# Patient Record
(demographics unavailable — no encounter records)

---

## 2024-10-29 NOTE — PROCEDURE
[de-identified] :  Procedure: Knee joint injection Laterality:  BILATERAL Indication: Osteoarthritis - discussed with patient Skin prep: alcohol and chlorhexidine Anesthesia: ethyl chloride spray Needle: 20-gauge Portal: inferolateral Aspiration: none Injectate: 2cc of 2% lidocaine, 2cc of 0.5% bupivacaine, and 1cc of 40mg/mL triamcinolone Dressing: Band-aid Complications: None  - RTC in 3 months to repeat bilateral knee CSI or to have surgical discussion - New PT script provided - Tylenol + Diclofenac prn

## 2024-10-29 NOTE — PROCEDURE
[de-identified] :  Procedure: Knee joint injection Laterality:  BILATERAL Indication: Osteoarthritis - discussed with patient Skin prep: alcohol and chlorhexidine Anesthesia: ethyl chloride spray Needle: 20-gauge Portal: inferolateral Aspiration: none Injectate: 2cc of 2% lidocaine, 2cc of 0.5% bupivacaine, and 1cc of 40mg/mL triamcinolone Dressing: Band-aid Complications: None  - RTC in 3 months to repeat bilateral knee CSI or to have surgical discussion - New PT script provided - Tylenol + Diclofenac prn

## 2025-01-23 NOTE — PHYSICAL EXAM
[General Appearance - Well Developed] : well developed [Normal Appearance] : normal appearance [Well Groomed] : well groomed [General Appearance - Well Nourished] : well nourished [No Deformities] : no deformities [General Appearance - In No Acute Distress] : no acute distress [Normal Conjunctiva] : the conjunctiva exhibited no abnormalities [Eyelids - No Xanthelasma] : the eyelids demonstrated no xanthelasmas [Normal Oral Mucosa] : normal oral mucosa [No Oral Pallor] : no oral pallor [No Oral Cyanosis] : no oral cyanosis [Normal Jugular Venous A Waves Present] : normal jugular venous A waves present [Normal Jugular Venous V Waves Present] : normal jugular venous V waves present [No Jugular Venous Su A Waves] : no jugular venous su A waves [Respiration, Rhythm And Depth] : normal respiratory rhythm and effort [Exaggerated Use Of Accessory Muscles For Inspiration] : no accessory muscle use [Auscultation Breath Sounds / Voice Sounds] : lungs were clear to auscultation bilaterally [Heart Sounds] : normal S1 and S2 [Murmurs] : no murmurs present [Arterial Pulses Normal] : the arterial pulses were normal [Abdomen Soft] : soft [Abdomen Tenderness] : non-tender [Abdomen Mass (___ Cm)] : no abdominal mass palpated [Abnormal Walk] : normal gait [Gait - Sufficient For Exercise Testing] : the gait was sufficient for exercise testing [Nail Clubbing] : no clubbing of the fingernails [Cyanosis, Localized] : no localized cyanosis [Petechial Hemorrhages (___cm)] : no petechial hemorrhages [Skin Color & Pigmentation] : normal skin color and pigmentation [] : no rash [No Venous Stasis] : no venous stasis [Skin Lesions] : no skin lesions [No Skin Ulcers] : no skin ulcer [No Xanthoma] : no  xanthoma was observed [Oriented To Time, Place, And Person] : oriented to person, place, and time [Affect] : the affect was normal [Mood] : the mood was normal [No Anxiety] : not feeling anxious [Bradycardia] : bradycardic [Irregularly Irregular] : irregularly irregular [Normal S1] : normal S1 [Normal S2] : normal S2 [No Murmur] : no murmurs heard [2+] : left 2+ [No Abnormalities] : the abdominal aorta was not enlarged and no bruit was heard [___ +] : bilateral [unfilled]U+ pitting edema to the knees [FreeTextEntry1] : + rectus diastasis [S3] : no S3 [S4] : no S4 [Right Carotid Bruit] : no bruit heard over the right carotid [Left Carotid Bruit] : no bruit heard over the left carotid [Right Femoral Bruit] : no bruit heard over the right femoral artery [Left Femoral Bruit] : no bruit heard over the left femoral artery

## 2025-01-23 NOTE — HISTORY OF PRESENT ILLNESS
[FreeTextEntry1] : Mr. Holbrook presents for follow up and management of HTN, dyslipidemia, atrial fibrillation s/p ablation 03/17/21 with recurrence, sick sinus syndrome, and obstructive sleep apnea.  He was admitted to [facility] on 09/09/19 with complaints of dizziness.  He was noted to have periods of marked bradycardia. He was seen by the heart rhythm service, Katia Campbell MD, and the decision was made to pursue aggressive CHELITA treatment first and consider a pacemaker in the future.  He had a repeat sleep study on 09/10/19 which revealed severe CHELITA with an AHI of 37.4.  He is using CPAP nightly.   He underwent a DCCV on 01/28/21 with immediate reversion to atrial fibrillation. He underwent atrial fibrillation ablation on 03/17/21 with Connor Michael MD. His ECG from 04/08/21 revealed recurrent atrial fibrillation and we discussed following up with Connor Michael MD.  The decision was made to pursue rate control.  He had an echocardiogram 04/08/21 which revealed an EF of 60%, severely dilated LA/RA dilated aortic root 3.7cm, mild LVH, aortic sclerosis, trace AI, and mild MR.  On 05/18/22 he had an exercise stress echocardiogram which revealed an EF of 60%, aortic sclerosis, mild MR and normal wall motion, PA pressures, and ECG post 7.0 METs of exercise.  On 05/09/24, he had an echocardiogram which revealed an EF of 59%, severely dilated LA, moderately dilated RA, aortic sclerosis, trace AI, and mild MR.  He has complaints of bilateral knee pain from arthritis.  He and his wife bought a house in the [facility].  He has recently taken up golf.  At present, he denies chest pain, palpitations, or dizziness and has WOODRUFF to two flights of stairs.

## 2025-01-23 NOTE — ASSESSMENT
[FreeTextEntry1] : ======================================================================================= 1. Persistent atrial fibrillation: s/p ablation 03/17/21, recurrence 04/2021: [id number] score 2 (2.2% thromboembolism risk pre year), possible neurologic event, sick sinus syndrome:  - continue systemic anticoagulation with Xarelto 20mg po daily (to be taken with the largest meal of the day)  - discussed with patient avoidance of NSAIDS and ASA as well as need for bleeding surveillance  - follow up with heart rhythm specialist, Connor Michael MD  - will likely require pacemaker implantation at some point in the future  - will follow left ventricular function with serial echocardiograms (ordered today)   2. HTN: BP at ACC/AHA 2017 guideline target:   - continue telmisartan/HCT 80/25mg po daily   3. Obstructive sleep apnea: severe AHI 37.5: on CPAP:  - continue CPAP   - follow up with sleep medicine specialist  4. Dyslipidemia: lipids at goal, LDL 90 (02/06/24):   - continue atorvastatin 20mg po daily  - will have repeal lab work at upcoming PMD visit   - discussed with patient therapeutic lifestyle changes to improve lipid metabolism  5. Rectus diastasis and right inguinal hernia: abdominal hernia:  - evaluated by a general surgeon, Avila Sheriff MD, and will pursue conservative management  7. Lower extremity edema: minimal at present:   - continue furosemide 40mg po bid prn  - continue compliance with venous compression stockings  - avoid dietary sodium  8. CAD: mild, noted on CTA chest pre-AF ablation: + CP: s/p normal stress echo (05/18/22):  - continue aggressive medical management  - continue off antiplatelet therapy given mild CAD and need for systemic anticoagulation

## 2025-01-23 NOTE — ASSESSMENT
[FreeTextEntry1] : ======================================================================================= 1. Persistent atrial fibrillation: s/p ablation 03/17/21, recurrence 04/2021: [id number] score 2 (2.2% thromboembolism risk pre year), possible neurologic event, sick sinus syndrome:  - continue systemic anticoagulation with Xarelto 20mg po daily (to be taken with the largest meal of the day)  - discussed with patient avoidance of NSAIDS and ASA as well as need for bleeding surveillance  - follow up with heart rhythm specialist, Connor Michael MD  - will likely require pacemaker implantation at some point in the future  - will follow left ventricular function with serial echocardiograms (ordered today)   2. HTN: BP at ACC/AHA 2017 guideline target:   - continue telmisartan/HCT 80/25mg po daily   3. Obstructive sleep apnea: severe AHI 37.5: on CPAP:  - continue CPAP   - follow up with sleep medicine specialist  4. Dyslipidemia: lipids at goal, LDL 90 (02/06/24):   - continue atorvastatin 20mg po daily  - will have repeal lab work at upcoming PMD visit   - discussed with patient therapeutic lifestyle changes to improve lipid metabolism  5. Rectus diastasis and right inguinal hernia: abdominal hernia:  - evaluated by a general surgeon, Avila Sheriff MD, and will pursue conservative management  7. Lower extremity edema: minimal at present:   - continue furosemide 40mg po bid prn  - continue compliance with venous compression stockings  - avoid dietary sodium  8. CAD: mild, noted on CTA chest pre-AF ablation: + CP: s/p normal stress echo (05/18/22):  - continue aggressive medical management  - continue off antiplatelet therapy given mild CAD and need for systemic anticoagulation

## 2025-01-23 NOTE — PHYSICAL EXAM
[General Appearance - Well Developed] : well developed [Normal Appearance] : normal appearance [Well Groomed] : well groomed [General Appearance - Well Nourished] : well nourished [No Deformities] : no deformities [General Appearance - In No Acute Distress] : no acute distress [Normal Conjunctiva] : the conjunctiva exhibited no abnormalities [Eyelids - No Xanthelasma] : the eyelids demonstrated no xanthelasmas [Normal Oral Mucosa] : normal oral mucosa [No Oral Pallor] : no oral pallor [No Oral Cyanosis] : no oral cyanosis [Normal Jugular Venous A Waves Present] : normal jugular venous A waves present [Normal Jugular Venous V Waves Present] : normal jugular venous V waves present [No Jugular Venous Su A Waves] : no jugular venous su A waves [Respiration, Rhythm And Depth] : normal respiratory rhythm and effort [Exaggerated Use Of Accessory Muscles For Inspiration] : no accessory muscle use [Auscultation Breath Sounds / Voice Sounds] : lungs were clear to auscultation bilaterally [Heart Sounds] : normal S1 and S2 [Murmurs] : no murmurs present [Arterial Pulses Normal] : the arterial pulses were normal [Abdomen Soft] : soft [Abdomen Tenderness] : non-tender [Abdomen Mass (___ Cm)] : no abdominal mass palpated [Abnormal Walk] : normal gait [Gait - Sufficient For Exercise Testing] : the gait was sufficient for exercise testing [Nail Clubbing] : no clubbing of the fingernails [Petechial Hemorrhages (___cm)] : no petechial hemorrhages [Cyanosis, Localized] : no localized cyanosis [Skin Color & Pigmentation] : normal skin color and pigmentation [] : no rash [No Venous Stasis] : no venous stasis [Skin Lesions] : no skin lesions [No Skin Ulcers] : no skin ulcer [No Xanthoma] : no  xanthoma was observed [Oriented To Time, Place, And Person] : oriented to person, place, and time [Affect] : the affect was normal [Mood] : the mood was normal [No Anxiety] : not feeling anxious [Bradycardia] : bradycardic [Irregularly Irregular] : irregularly irregular [Normal S1] : normal S1 [Normal S2] : normal S2 [No Murmur] : no murmurs heard [2+] : left 2+ [No Abnormalities] : the abdominal aorta was not enlarged and no bruit was heard [___ +] : bilateral [unfilled]U+ pitting edema to the knees [FreeTextEntry1] : + rectus diastasis [S3] : no S3 [S4] : no S4 [Right Carotid Bruit] : no bruit heard over the right carotid [Left Carotid Bruit] : no bruit heard over the left carotid [Right Femoral Bruit] : no bruit heard over the right femoral artery [Left Femoral Bruit] : no bruit heard over the left femoral artery

## 2025-01-23 NOTE — HISTORY OF PRESENT ILLNESS
[FreeTextEntry1] : Mr. Holbrook presents for follow up and management of HTN, dyslipidemia, atrial fibrillation s/p ablation 03/17/21 with recurrence, sick sinus syndrome, and obstructive sleep apnea.  He was admitted to [facility] on 09/09/19 with complaints of dizziness.  He was noted to have periods of marked bradycardia. He was seen by the heart rhythm service, Katia Campbell MD, and the decision was made to pursue aggressive CHELITA treatment first and consider a pacemaker in the future.  He had a repeat sleep study on 09/10/19 which revealed severe CHELITA with an AHI of 37.4.  He is using CPAP nightly.   He underwent a DCCV on 01/28/21 with immediate reversion to atrial fibrillation. He underwent atrial fibrillation ablation on 03/17/21 with Connor Michael MD. His ECG from 04/08/21 revealed recurrent atrial fibrillation and we discussed following up with Connor Michael MD.  The decision was made to pursue rate control.  He had an echocardiogram 04/08/21 which revealed an EF of 60%, severely dilated LA/RA dilated aortic root 3.7cm, mild LVH, aortic sclerosis, trace AI, and mild MR.  On 05/18/22 he had an exercise stress echocardiogram which revealed an EF of 60%, aortic sclerosis, mild MR and normal wall motion, PA pressures, and ECG post 7.0 METs of exercise.  On 05/09/24, he had an echocardiogram which revealed an EF of 59%, severely dilated LA, moderately dilated RA, aortic sclerosis, trace AI, and mild MR.  He has complaints of bilateral knee pain from arthritis.  He and his wife bought a house in the [facility].  He has recently taken up golf.  At present, he denies chest pain, palpitations, or dizziness and has WOODRUFF to two flights of stairs.

## 2025-01-23 NOTE — REASON FOR VISIT
[Other: ____] : [unfilled] [FreeTextEntry1] : ======================================================================================= Diagnostic Tests: -------------------------------------------------------------- EC/10/24: atrial fibrillation with slow ventricular response, NSST changes.  24: atrial fibrillation with slow ventricular response, inferolateral early repolarization abnormalities.  23: atrial fibrillation, very slow ventricular response at 38 bpm, NSST changes. 22: atrial fibrillation, slow ventricular response at 46 bpm, NSST changes. 22: atrial fibrillation, slow ventricular response at 42 bpm, NSST changes. 22: atrial fibrillation, slow ventricular response at 42 bpm, NSST changes. 21: atrial fibrillation, slow ventricular response at 43 bpm, NSST changes. 07/15/21: atrial fibrillation, slow ventricular response at 49 bpm, NSST changes.  21: atrial fibrillation, slow ventricular response (? 2 sinus beats).  21: atrial fibrillation, slow ventricular response, 40 bpm, LVH. 01/15/21: atrial fibrillation, slow ventricular response, 42 bpm, LVH. 12/10/20: atrial fibrillation, slow ventricular response, 38 bpm, LVH. 20: atrial fibrillation, slow ventricular response, 44 bpm, LVH. 19: atrial fibrillation, slow ventricular response, 44 bpm, LVH. 19: atrial fibrillation, slow ventricular response, 41 bpm, LVH. 19: atrial fibrillation, slow ventricular response, 44 bpm, LVH. 19: atrial fibrillation, slow ventricular response, 39 bpm, LVH. 18: atrial fibrillation, slow ventricular response, 47 bpm, LVH. 18: atrial fibrillation, slow ventricular response, 47 bpm, LVH. 17: atrial fibrillation, slow ventricular response, 47 bpm, LVH. 16: atrial fibrillation, slow ventricular response, 57 bpm, LVH. 16: atrial fibrillation, slow ventricular response, 55 bpm, LVH. 13: sinus bradycardia with non-specific ST/T changes.  -------------------------------------------------------------- Echo:  24: EF 59%, severely dilated LA, moderately dilated RA, aortic sclerosis, trace AI, mild MR.  23: EF 58%, severely dilated LA, aortic sclerosis, trace AI, aortic root 3.6 cm.   21: EF 60%, severely dilated LA/RA, dilated aortic root 3.7cm, mild LVH, aortic sclerosis, trace AI, mild MR.  20: EF 66%, severely dilated LA/RA, dilated aortic root 3.7cm, mild LVH, aortic sclerosis, trace AI, mild MR.  19: EF 68%, dilated LA.  18: EF 60%, moderately dilated LA, trace MR/TR.  16: EF 59%, dilated LA, mild MR, trace AI/TR 03: normal EF, trace MR, trace TR, dilated LA -------------------------------------------------------------- Stress:  22: exercise stress echo: EF 60%, aortic sclerosis, mild MR, normal wall motion, PA pressures, and ECG post 7.0 METs of exercise.  19: exercise stress echo: EF 60%, 8.1 METS, normal wall motion and PA pressures.  10/16/07: EF 65%, normal wall motion and heart rate response after 13 METS 10/13/03: exercise MPI stress: 13 METS normal perfusion, EF 49%. -------------------------------------------------------------- Monitor: 12/15/20 to 20: Biotel: HR 24/46/122, 93% AF, longest pause 3.2 seconds 19 - 19: [place] MCT: AF, HR average 50-60, no pauses.  19: Holter: HR , av 54, 3 pauses >3 seconds, 1 3-beat run NSVT. -------------------------------------------------------------- CT: 19: head: no CVA. 19: head/neck CTA: no significant JOSE DAVID.  -------------------------------------------------------------- MR: 19: brain: normal, no CVA.  -------------------------------------------------------------- Sleep study: 09/10/19: AHI 37.4, severe CHELITA.

## 2025-01-28 NOTE — PROCEDURE
[de-identified] : Procedure: Knee joint injection Laterality: BILATERAL Indication: Osteoarthritis - discussed with patient Skin prep: alcohol and chlorhexidine Anesthesia: ethyl chloride spray Needle: 20-gauge Portal: inferolateral Aspiration: none Injectate: 2cc of 2% lidocaine, 2cc of 0.5% bupivacaine, and 1cc of 40mg/mL triamcinolone Dressing: Band-aid Complications: None  - RTC in 3 months to repeat bilateral knee CSI or to have surgical discussion - New PT script provided - Tylenol + Diclofenac prn.

## 2025-03-04 NOTE — HISTORY OF PRESENT ILLNESS
[Never] : never [TextBox_4] : 71-year-old with known hx of CHELITA presenting for follow up. Patient states that he is compliant with machine and is able to sleep with it through the night as long as he uses the ambien. He still uses the 10mg and has never tried the 5mg dose. No new issues since last visit and getting supplies without issues. [ESS] : 2

## 2025-03-04 NOTE — PHYSICAL EXAM
[No Acute Distress] : no acute distress [Normal Oropharynx] : normal oropharynx [Normal Appearance] : normal appearance [No Clubbing] : no clubbing [No Cyanosis] : no cyanosis [No Edema] : no edema [Normal Color/ Pigmentation] : normal color/ pigmentation [Oriented x3] : oriented x3 [Normal Affect] : normal affect [TextBox_2] : Limited due to telehealth visit

## 2025-03-04 NOTE — REASON FOR VISIT
[Home] : at home, [unfilled] , at the time of the visit. [Medical Office: ([facility])___] : at the medical office located in  [Telehealth (audio & video)] : This visit was provided via telehealth using real-time 2-way audio visual technology. [Verbal consent obtained from patient] : the patient, [unfilled] [Follow-Up] : a follow-up visit [Sleep Evaluation] : sleep evaluation [Sleep Apnea] : sleep apnea

## 2025-03-04 NOTE — ASSESSMENT
[FreeTextEntry1] : 71-year-old with long standing CHELITA on pap therapy presenting for follow up.   Data reviewed: Sleep study 09/2019- AHI 30-37- severe CHELITA Compliance report 12/04/2024- 03/2/2025- 81/90 days used; average use of 5 hours and 36 minutes; AHI 2.7.    CHELITA Insomnia Obesity  Patient with goal compliance and machine seems to be correcting CHELITA well. Patient still with issues going to sleep when using the machine but with Ambien 10mg he is able to fall asleep and stay asleep while using the PAP machine. Advised that he needs to sleep with machine nightly and would try to reduce dose to 5mg to find lowest dose with effect. Patient open to trying.  - Compliance report at goal - Will trial Ambien 5 mg some nights

## 2025-03-11 NOTE — HEALTH RISK ASSESSMENT
[NO] : No [Employed] : employed [Audit-CScore] : 2 [[id number]] : 0 [Change in mental status noted] : No change in mental status noted [Reports changes in hearing] : Reports no changes in hearing [Reports changes in vision] : Reports no changes in vision [Reports normal functional visual acuity (ie: able to read med bottle)] : Reports poor functional visual acuity.  [Sunscreen] : does not use sunscreen [TB Exposure] : is not being exposed to tuberculosis [ColonoscopyDate] : 06/2021 [AdvancecareDate] : 03/2025

## 2025-03-11 NOTE — HEALTH RISK ASSESSMENT
[NO] : No [Employed] : employed [Audit-CScore] : 2 [[id number]] : 0 [Change in mental status noted] : No change in mental status noted [Reports changes in hearing] : Reports no changes in hearing [Reports changes in vision] : Reports no changes in vision [Reports normal functional visual acuity (ie: able to read med bottle)] : Reports poor functional visual acuity.  [Sunscreen] : does not use sunscreen [TB Exposure] : is not being exposed to tuberculosis [ColonoscopyDate] : 06/2021 [AdvancecareDate] : 03/2025

## 2025-03-11 NOTE — ASSESSMENT
[FreeTextEntry1] : Health Maintenance Daily aerobic exercises strongly recommended. No STD risk or substance abuse per patient report. Occasional gender specific self-examination is suggested. Hepatitis C antibody sent with patient approval. No depression. Competent with ADLs. Patient is scheduled for follow-up colonoscopy in 3 months Completed primary COVID-vaccine series with all boosters as well as updated variant specific vaccine this year. Received PCV-20 last year.  Follow-up not necessary. Already received flu vaccine earlier in the year for this season. Completed shingles vaccine series at pharmacy several years ago. Recommend RSV vaccine at pharmacy within the next few months.  Patient intends to pursue.  Obesity His BMI is 33.8 and at least a 25 pound weight loss is recommended. Obesity-associated morbidity discussed including T2DM/HTN (which he is already diagnosed with), fatty liver, risk for heart disease, risk for lower extremity arthritis, and others. Lifestyle management reviewed including daily aerobic exercise as tolerated and structured low-calorie diet.  Specific recommendations provided. Consider medication-based treatment (i.e. GLP-1) especially if updated hemoglobin A1c is elevated.  Note that it had already been elevated at 6.7 in the past but most most recently 6.2  Chronic insomnia/CHELITA on CPAP Patient states he did not do well with trazodone as prescribed last year. In addition to CPAP, continues on zolpidem as prescribed by his specialist which she says is very effective.  T2DM REVIEWED serial hemoglobin A1c results (6.7 in 2023 down to 6.2 in 2/2024) which establishes diagnosis of borderline T2DM. Explained the rationale (to reduce vascular and other complications) as well as the goal (hemoglobin A1c under 6.0) for management of this condition especially if hemoglobin A1c continues to, trend upwards above 7. Reviewed lifestyle management including daily aerobic exercise and calorie-restricted diet with the intention to achieve weight loss (as above). Follow-up hemoglobin A1c sent today.  If again above 6.5 range, would consider starting medical treatment including GLP 1 RA which may also help achieve desired weight loss. Patient indicates understanding and agreement.  Atrial fibrillation On Xarelto as prophylaxis for clot formation. Ongoing management per cardiology. Patient states he is "sensitive to this diagnosis "but does not have any significant symptomatology.  HTN BP taken x 2 in office today. 127/74 on both occasions. Note that prior recorded SBP range over the past year has been between 140 and 160 apart from 3/16/2023 when it was 115. Current blood pressure is well within goal of treatment requirements indicating good response to telmisartan HCTZ 80/25 mg.  Which is well-tolerated. Reviewed lifestyle management recommendations which include #1) especially weight loss; #2) increase daily aerobic exercise; #3) anxiety management; and #4) reduction of salt in diet. Patient advised that if he is successful at losing substantial amount of weight, his requirement for BP medications is likely to decrease.  Right groin discomfort Exam in office today is completely negative for mass, bulging, or tenderness.  Scrotal exam is also normal. Suspect groin muscle strain v early hiatal hernia formation. Recommend further evaluation by urologist including ultrasound study. Referral/contact information provided.  Patient states he will make an appointment within the next few months.

## 2025-03-11 NOTE — HISTORY OF PRESENT ILLNESS
[FreeTextEntry1] : 71-year-old male, on treatment for atrial fibrillation (Xarelto), lower extremity edema (furosemide as needed) now resolved, CHELITA (on CPAP), HTN (telmisartan), HLD (atorvastatin), also with diagnoses of borderline T2DM (hemoglobin A1c = 6.7), rectus diastases, insomnia, and ?hyperthyroidism, now returns for CPE. [de-identified] : Complains of occasional tugging sensation in right groin, possibly related to activity, not associated with any bulging, minimally symptomatic, and nonprogressive over the past year. Also states that he requires taking zolpidem nightly for insomnia (for the past several years).  Is being managed by a "sleep doctor ".  Treatment is being provided in addition to CPAP. Wishes to discuss diagnosis of borderline diabetes. Followed by cardiology for atrial fibrillation with minor coronary artery disease.

## 2025-03-11 NOTE — HISTORY OF PRESENT ILLNESS
[FreeTextEntry1] : 71-year-old male, on treatment for atrial fibrillation (Xarelto), lower extremity edema (furosemide as needed) now resolved, CHELITA (on CPAP), HTN (telmisartan), HLD (atorvastatin), also with diagnoses of borderline T2DM (hemoglobin A1c = 6.7), rectus diastases, insomnia, and ?hyperthyroidism, now returns for CPE. [de-identified] : Complains of occasional tugging sensation in right groin, possibly related to activity, not associated with any bulging, minimally symptomatic, and nonprogressive over the past year. Also states that he requires taking zolpidem nightly for insomnia (for the past several years).  Is being managed by a "sleep doctor ".  Treatment is being provided in addition to CPAP. Wishes to discuss diagnosis of borderline diabetes. Followed by cardiology for atrial fibrillation with minor coronary artery disease.

## 2025-04-23 NOTE — HISTORY OF PRESENT ILLNESS
[Never] : never [TextBox_4] : 71-year-old with known hx of CHELITA presenting for follow up. Patient states that he is compliant with machine and is able to sleep with it through the night as long as he uses the ambien. He still uses the 10mg and has never tried the 5mg dose. No new issues since last visit and getting supplies without issues.  04/22/25 Patient presenting for follow up with eziow hx of CHELITA on pap therapy. States that he does take Ambien at times to help him get to sleep but always uses the CPAP. Still has clinical improvement on the CPAP.  [ESS] : 2

## 2025-04-23 NOTE — PHYSICAL EXAM
[No Acute Distress] : no acute distress [Normal Oropharynx] : normal oropharynx [Normal Appearance] : normal appearance [No Neck Mass] : no neck mass [Normal S1, S2] : normal s1, s2 [Normal Rate/Rhythm] : normal rate/rhythm [No Murmurs] : no murmurs [No Resp Distress] : no resp distress [Clear to Auscultation Bilaterally] : clear to auscultation bilaterally [Normal Gait] : normal gait [No Clubbing] : no clubbing [No Cyanosis] : no cyanosis [No Edema] : no edema [FROM] : FROM [Normal Color/ Pigmentation] : normal color/ pigmentation [No Focal Deficits] : no focal deficits [Oriented x3] : oriented x3 [Normal Affect] : normal affect

## 2025-04-23 NOTE — ASSESSMENT
[FreeTextEntry1] : 71-year-old with long standing CHELITA on pap therapy presenting for follow up.   Data reviewed: Sleep study 09/2019- AHI 30-37- severe CHELITA Compliance report 12/04/2024- 03/2/2025- 81/90 days used; average use of 5 hours and 36 minutes; AHI 2.7.    CHELITA Insomnia Obesity  Patient with goal compliance and machine seems to be correcting CHELITA well. Patient still with issues going to sleep and discussed sleep hygiene modifications. Also advised that if he does use the ambien he has to use the CPAP at night as the ambien will make his CHELITA worse if he does not.   - Compliance report at goal - Will trial Ambien 5 mg some nights - Sleep hygiene discussed.

## 2025-04-30 NOTE — PROCEDURE
1 -patient is doing well no active issues she will try to do some pool aquatic exercises    She should clear that with her orthopedic specialist  [de-identified] : Reports about 2 months of solid pain relief in both knees following prior B CSI. Is considering surgery potentially in the fall, but would like to continue injections today.  Procedure: Knee joint injection Laterality: Bilateral Indication: Osteoarthritis - discussed with patient Skin prep: alcohol and chlorhexidine Anesthesia: ethyl chloride spray Needle: 20-gauge Portal: inferolateral Aspiration: none Injectate: 2cc of 2% lidocaine, 2cc of 0.5% bupivacaine, and 1cc of 40mg/mL triamcinolone Dressing: Band-aid Complications: None  RTC 3mo with no new XRs needed. Encouraged him to bring his wife for surgical discussion.

## 2025-04-30 NOTE — PROCEDURE
[de-identified] : Reports about 2 months of solid pain relief in both knees following prior B CSI. Is considering surgery potentially in the fall, but would like to continue injections today.  Procedure: Knee joint injection Laterality: Bilateral Indication: Osteoarthritis - discussed with patient Skin prep: alcohol and chlorhexidine Anesthesia: ethyl chloride spray Needle: 20-gauge Portal: inferolateral Aspiration: none Injectate: 2cc of 2% lidocaine, 2cc of 0.5% bupivacaine, and 1cc of 40mg/mL triamcinolone Dressing: Band-aid Complications: None  RTC 3mo with no new XRs needed. Encouraged him to bring his wife for surgical discussion.

## 2025-05-29 NOTE — HISTORY OF PRESENT ILLNESS
[FreeTextEntry1] : Mr. Holbrook presents for follow up and management of HTN, dyslipidemia, atrial fibrillation s/p ablation 03/17/21 with recurrence, sick sinus syndrome, and obstructive sleep apnea.  He was admitted to [facility] on 09/09/19 with complaints of dizziness.  He was noted to have periods of marked bradycardia. He was seen by the heart rhythm service, Katia Campbell MD, and the decision was made to pursue aggressive CHELITA treatment first and consider a pacemaker in the future.  He had a repeat sleep study on 09/10/19 which revealed severe CHELITA with an AHI of 37.4.  He is using CPAP nightly.   He underwent a DCCV on 01/28/21 with immediate reversion to atrial fibrillation. He underwent atrial fibrillation ablation on 03/17/21 with Connor Michael MD. His ECG from 04/08/21 revealed recurrent atrial fibrillation and we discussed following up with Connor Michael MD.  The decision was made to pursue rate control.   He and his wife bought a house in the [facility].  He has recently taken up golf.  He has lost 5 pounds since last visit.  Today, 05/29/25, he had an echocardiogram which revealed an EF of 64%, mild LVH, severely dilated LA, moderately dilated RA, aortic sclerosis, mild MR, and a dilated aortic root (4.0 cm, 1.9 cm/m2).

## 2025-05-29 NOTE — REASON FOR VISIT
[Other: ____] : [unfilled] [FreeTextEntry1] : ======================================================================================= Diagnostic Tests: -------------------------------------------------------------- EC25: atrial fibrillation with slow ventricular response, NSST changes.  09/10/24: atrial fibrillation with slow ventricular response, NSST changes.  24: atrial fibrillation with slow ventricular response, inferolateral early repolarization abnormalities.  23: atrial fibrillation, very slow ventricular response at 38 bpm, NSST changes. 22: atrial fibrillation, slow ventricular response at 46 bpm, NSST changes. 22: atrial fibrillation, slow ventricular response at 42 bpm, NSST changes. 22: atrial fibrillation, slow ventricular response at 42 bpm, NSST changes. 21: atrial fibrillation, slow ventricular response at 43 bpm, NSST changes. 07/15/21: atrial fibrillation, slow ventricular response at 49 bpm, NSST changes.  21: atrial fibrillation, slow ventricular response (? 2 sinus beats).  21: atrial fibrillation, slow ventricular response, 40 bpm, LVH. 01/15/21: atrial fibrillation, slow ventricular response, 42 bpm, LVH. 12/10/20: atrial fibrillation, slow ventricular response, 38 bpm, LVH. 20: atrial fibrillation, slow ventricular response, 44 bpm, LVH. 19: atrial fibrillation, slow ventricular response, 44 bpm, LVH. 19: atrial fibrillation, slow ventricular response, 41 bpm, LVH. 19: atrial fibrillation, slow ventricular response, 44 bpm, LVH. 19: atrial fibrillation, slow ventricular response, 39 bpm, LVH. 18: atrial fibrillation, slow ventricular response, 47 bpm, LVH. 18: atrial fibrillation, slow ventricular response, 47 bpm, LVH. 17: atrial fibrillation, slow ventricular response, 47 bpm, LVH. 16: atrial fibrillation, slow ventricular response, 57 bpm, LVH. 16: atrial fibrillation, slow ventricular response, 55 bpm, LVH. 13: sinus bradycardia with non-specific ST/T changes.  -------------------------------------------------------------- Echo:  25: EF 64%, mild LVH, severely dilated LA, moderately dilated RA, aortic sclerosis, mild MR, dilated aortic root (4.0 cm, 1.9 cm/m2). 24: EF 59%, severely dilated LA, moderately dilated RA, aortic sclerosis, trace AI, mild MR.  23: EF 58%, severely dilated LA, aortic sclerosis, trace AI, aortic root 3.6 cm.   21: EF 60%, severely dilated LA/RA, dilated aortic root 3.7cm, mild LVH, aortic sclerosis, trace AI, mild MR.  20: EF 66%, severely dilated LA/RA, dilated aortic root 3.7cm, mild LVH, aortic sclerosis, trace AI, mild MR.  19: EF 68%, dilated LA.  18: EF 60%, moderately dilated LA, trace MR/TR.  16: EF 59%, dilated LA, mild MR, trace AI/TR 03: normal EF, trace MR, trace TR, dilated LA -------------------------------------------------------------- Stress tests:  22: exercise stress echo: EF 60%, aortic sclerosis, mild MR, normal wall motion, PA pressures, and ECG post 7.0 METs of exercise.  19: exercise stress echo: EF 60%, 8.1 METS, normal wall motion and PA pressures.  10/16/07: EF 65%, normal wall motion and heart rate response after 13 METS 10/13/03: exercise MPI stress: 13 METS normal perfusion, EF 49%. -------------------------------------------------------------- Cardiac rhythm monitors: 12/15/20 to 20: Biotel: HR 24/46/122, 93% AF, longest pause 3.2 seconds 19 - 19: [place] MCT: AF, HR average 50-60, no pauses.  19: Holter: HR , av 54, 3 pauses >3 seconds, 1 3-beat run NSVT. -------------------------------------------------------------- CT: 19: head: no CVA. 19: head/neck CTA: no significant JOSE DAVID.  -------------------------------------------------------------- MRI: 19: brain: normal, no CVA.  -------------------------------------------------------------- Sleep studies: 09/10/19: AHI 37.4, severe CHELITA.

## 2025-05-29 NOTE — HISTORY OF PRESENT ILLNESS
[FreeTextEntry1] : Mr. Holbrook presents for follow up and management of HTN, dyslipidemia, atrial fibrillation s/p ablation 03/17/21 with recurrence, sick sinus syndrome, and obstructive sleep apnea.  He was admitted to [facility] on 09/09/19 with complaints of dizziness.  He was noted to have periods of marked bradycardia. He was seen by the heart rhythm service, Katia Campbell MD, and the decision was made to pursue aggressive CHELITA treatment first and consider a pacemaker in the future.  He had a repeat sleep study on 09/10/19 which revealed severe CHELITA with an AHI of 37.4.  He is using CPAP nightly.   He underwent a DCCV on 01/28/21 with immediate reversion to atrial fibrillation. He underwent atrial fibrillation ablation on 03/17/21 with Connor Michael MD. His ECG from 04/08/21 revealed recurrent atrial fibrillation and we discussed following up with Connor Michael MD.  The decision was made to pursue rate control.   He and his wife bought a house in the [facility].  He has recently taken up golf.  He has lost 5 pounds since last visit.  Today, 05/29/25, he had an echocardiogram which revealed an EF of 64%, mild LVH, severely dilated LA, moderately dilated RA, aortic sclerosis, mild MR, and a dilated aortic root (4.0 cm, 1.9 cm/m2).

## 2025-05-29 NOTE — ASSESSMENT
[FreeTextEntry1] : ======================================================================================= 1. Persistent atrial fibrillation: s/p ablation 03/17/21, recurrence 04/2021: [id number] score 2 (2.2% thromboembolism risk pre year), possible neurologic event, sick sinus syndrome:  - continue systemic anticoagulation with Xarelto 20mg po daily (to be taken with the largest meal of the day)  - discussed with patient avoidance of NSAIDS and ASA as well as need for bleeding surveillance  - follow up with heart rhythm specialist, Connor Michael MD  - will likely require pacemaker implantation at some point in the future  - will follow left ventricular function with serial echocardiograms   2. HTN: BP at ACC/AHA 2017 guideline target:   - continue telmisartan/HCT 80/25mg po daily   3. Obstructive sleep apnea: severe AHI 37.5: on CPAP:  - continue CPAP   - follow up with sleep medicine specialist  4. Dyslipidemia: lipids at goal, LDL 97 (03/27/25):   - continue atorvastatin 20mg po daily  - will have repeal lab work at upcoming PMD visit   - discussed with patient therapeutic lifestyle changes to improve lipid metabolism  5. Rectus diastasis and right inguinal hernia: abdominal hernia:  - will send to a general surgeon, Shell Esposito MD (previous saw Avila Sheriff MD)   7. Lower extremity edema: minimal at present:   - continue furosemide 40mg po bid prn  - continue compliance with venous compression stockings  - avoid dietary sodium  8. CAD: mild, noted on CTA chest pre-AF ablation: + CP: s/p normal stress echo (05/18/22):  - continue aggressive medical management  - continue off antiplatelet therapy given mild CAD and need for systemic anticoagulation   The patient was seen and examined with a student as part of their educational experience.  Permission was obtained at the time of the visit from the patient for the student's participation.  I spent > 60 minutes of total time on this visit, including time spent face-to-face and non-face-to-face.  During this time, I took a relevant history and examined the patient.  I reviewed relevant portions of the medical record and formulated a differential diagnosis and plan.  I explained the relevant cardiac diagnoses to the patient, as well as the work up and management plan.  I answered all questions related to the patient's cardiac conditions.

## 2025-05-29 NOTE — ASSESSMENT
[FreeTextEntry1] : ======================================================================================= 1. Persistent atrial fibrillation: s/p ablation 03/17/21, recurrence 04/2021: [id number] score 2 (2.2% thromboembolism risk pre year), possible neurologic event, sick sinus syndrome:  - continue systemic anticoagulation with Xarelto 20mg po daily (to be taken with the largest meal of the day)  - discussed with patient avoidance of NSAIDS and ASA as well as need for bleeding surveillance  - follow up with heart rhythm specialist, Connor Michael MD  - will likely require pacemaker implantation at some point in the future  - will follow left ventricular function with serial echocardiograms   2. HTN: BP at ACC/AHA 2017 guideline target:   - continue telmisartan/HCT 80/25mg po daily   3. Obstructive sleep apnea: severe AHI 37.5: on CPAP:  - continue CPAP   - follow up with sleep medicine specialist  4. Dyslipidemia: lipids at goal, LDL 97 (03/27/25):   - continue atorvastatin 20mg po daily  - will have repeal lab work at upcoming PMD visit   - discussed with patient therapeutic lifestyle changes to improve lipid metabolism  5. Rectus diastasis and right inguinal hernia: abdominal hernia:  - will send to a general surgeon, Shell Esposito MD (previous saw Avila Sheriff MD)   7. Lower extremity edema: minimal at present:   - continue furosemide 40mg po bid prn  - continue compliance with venous compression stockings  - avoid dietary sodium  8. CAD: mild, noted on CTA chest pre-AF ablation: + CP: s/p normal stress echo (05/18/22):  - continue aggressive medical management  - continue off antiplatelet therapy given mild CAD and need for systemic anticoagulation   The patient was seen and examined with a student as part of their educational experience.  Permission was obtained at the time of the visit from the patient for the student's participation.  I spent > 60 minutes of total time on this visit, including time spent face-to-face and non-face-to-face.  During this time, I took a relevant history and examined the patient.  I reviewed relevant portions of the medical record and formulated a differential diagnosis and plan.  I explained the relevant cardiac diagnoses to the patient, as well as the work up and management plan.  I answered all questions related to the patient's cardiac conditions.